# Patient Record
Sex: FEMALE | Race: WHITE
[De-identification: names, ages, dates, MRNs, and addresses within clinical notes are randomized per-mention and may not be internally consistent; named-entity substitution may affect disease eponyms.]

---

## 2019-07-16 ENCOUNTER — HOSPITAL ENCOUNTER (INPATIENT)
Dept: HOSPITAL 54 - ER | Age: 69
LOS: 5 days | Discharge: HOME HEALTH | DRG: 570 | End: 2019-07-21
Attending: INTERNAL MEDICINE | Admitting: INTERNAL MEDICINE
Payer: MEDICARE

## 2019-07-16 VITALS — WEIGHT: 175 LBS | BODY MASS INDEX: 29.88 KG/M2 | HEIGHT: 64 IN

## 2019-07-16 VITALS — DIASTOLIC BLOOD PRESSURE: 60 MMHG | SYSTOLIC BLOOD PRESSURE: 129 MMHG

## 2019-07-16 VITALS — DIASTOLIC BLOOD PRESSURE: 84 MMHG | SYSTOLIC BLOOD PRESSURE: 161 MMHG

## 2019-07-16 DIAGNOSIS — E78.5: ICD-10-CM

## 2019-07-16 DIAGNOSIS — N17.0: ICD-10-CM

## 2019-07-16 DIAGNOSIS — I12.9: ICD-10-CM

## 2019-07-16 DIAGNOSIS — E11.69: ICD-10-CM

## 2019-07-16 DIAGNOSIS — E87.1: ICD-10-CM

## 2019-07-16 DIAGNOSIS — E11.42: ICD-10-CM

## 2019-07-16 DIAGNOSIS — L03.116: Primary | ICD-10-CM

## 2019-07-16 DIAGNOSIS — E87.6: ICD-10-CM

## 2019-07-16 DIAGNOSIS — L60.3: ICD-10-CM

## 2019-07-16 DIAGNOSIS — E86.9: ICD-10-CM

## 2019-07-16 DIAGNOSIS — N18.9: ICD-10-CM

## 2019-07-16 DIAGNOSIS — E11.65: ICD-10-CM

## 2019-07-16 DIAGNOSIS — Z79.4: ICD-10-CM

## 2019-07-16 DIAGNOSIS — Z79.84: ICD-10-CM

## 2019-07-16 DIAGNOSIS — L97.528: ICD-10-CM

## 2019-07-16 DIAGNOSIS — E11.621: ICD-10-CM

## 2019-07-16 DIAGNOSIS — M86.8X7: ICD-10-CM

## 2019-07-16 DIAGNOSIS — E11.22: ICD-10-CM

## 2019-07-16 LAB
ALBUMIN SERPL BCP-MCNC: 3.1 G/DL (ref 3.4–5)
ALP SERPL-CCNC: 103 U/L (ref 46–116)
ALT SERPL W P-5'-P-CCNC: 17 U/L (ref 12–78)
APPEARANCE UR: CLEAR
AST SERPL W P-5'-P-CCNC: 11 U/L (ref 15–37)
BASOPHILS # BLD AUTO: 0.2 /CMM (ref 0–0.2)
BASOPHILS NFR BLD AUTO: 1.3 % (ref 0–2)
BILIRUB DIRECT SERPL-MCNC: 0.1 MG/DL (ref 0–0.2)
BILIRUB SERPL-MCNC: 0.5 MG/DL (ref 0.2–1)
BILIRUB UR QL STRIP: NEGATIVE
BUN SERPL-MCNC: 25 MG/DL (ref 7–18)
CALCIUM SERPL-MCNC: 9.4 MG/DL (ref 8.5–10.1)
CHLORIDE SERPL-SCNC: 96 MMOL/L (ref 98–107)
CO2 SERPL-SCNC: 26 MMOL/L (ref 21–32)
COLOR UR: YELLOW
CREAT SERPL-MCNC: 1.5 MG/DL (ref 0.6–1.3)
EOSINOPHIL NFR BLD AUTO: 0.6 % (ref 0–6)
GLUCOSE SERPL-MCNC: 527 MG/DL (ref 74–106)
GLUCOSE UR STRIP-MCNC: (no result) MG/DL
HCT VFR BLD AUTO: 40 % (ref 33–45)
HGB BLD-MCNC: 13.5 G/DL (ref 11.5–14.8)
HGB UR QL STRIP: (no result) ERY/UL
KETONES UR STRIP-MCNC: NEGATIVE MG/DL
LEUKOCYTE ESTERASE UR QL STRIP: NEGATIVE
LYMPHOCYTES NFR BLD AUTO: 1.8 /CMM (ref 0.8–4.8)
LYMPHOCYTES NFR BLD AUTO: 15.5 % (ref 20–44)
MCHC RBC AUTO-ENTMCNC: 34 G/DL (ref 31–36)
MCV RBC AUTO: 88 FL (ref 82–100)
MONOCYTES NFR BLD AUTO: 0.9 /CMM (ref 0.1–1.3)
MONOCYTES NFR BLD AUTO: 7.4 % (ref 2–12)
NEUTROPHILS # BLD AUTO: 8.8 /CMM (ref 1.8–8.9)
NEUTROPHILS NFR BLD AUTO: 75.2 % (ref 43–81)
NITRITE UR QL STRIP: NEGATIVE
PH UR STRIP: 6 [PH] (ref 5–8)
PLATELET # BLD AUTO: 316 /CMM (ref 150–450)
POTASSIUM SERPL-SCNC: 4.6 MMOL/L (ref 3.5–5.1)
PROT SERPL-MCNC: 8.2 G/DL (ref 6.4–8.2)
PROT UR QL STRIP: (no result) MG/DL
RBC # BLD AUTO: 4.52 MIL/UL (ref 4–5.2)
RBC #/AREA URNS HPF: (no result) /HPF (ref 0–2)
SODIUM SERPL-SCNC: 132 MMOL/L (ref 136–145)
UROBILINOGEN UR STRIP-MCNC: 0.2 EU/DL
WBC #/AREA URNS HPF: (no result) /HPF (ref 0–3)
WBC NRBC COR # BLD AUTO: 11.7 K/UL (ref 4.3–11)

## 2019-07-16 PROCEDURE — A6403 STERILE GAUZE>16 <= 48 SQ IN: HCPCS

## 2019-07-16 PROCEDURE — G0378 HOSPITAL OBSERVATION PER HR: HCPCS

## 2019-07-16 RX ADMIN — METFORMIN HYDROCHLORIDE SCH MG: 500 TABLET, FILM COATED ORAL at 17:17

## 2019-07-16 RX ADMIN — CARVEDILOL SCH MG: 12.5 TABLET, FILM COATED ORAL at 17:00

## 2019-07-16 RX ADMIN — INSULIN HUMAN PRN UNIT: 100 INJECTION, SOLUTION PARENTERAL at 18:10

## 2019-07-16 RX ADMIN — SODIUM CHLORIDE PRN MLS/HR: 9 INJECTION, SOLUTION INTRAVENOUS at 18:08

## 2019-07-16 RX ADMIN — CARVEDILOL SCH MG: 12.5 TABLET, FILM COATED ORAL at 17:18

## 2019-07-16 RX ADMIN — Medication SCH EACH: at 21:31

## 2019-07-16 RX ADMIN — ENOXAPARIN SODIUM SCH MG: 40 INJECTION SUBCUTANEOUS at 17:30

## 2019-07-16 RX ADMIN — HUMAN INSULIN PRN UNIT: 100 INJECTION, SOLUTION SUBCUTANEOUS at 21:32

## 2019-07-16 RX ADMIN — ATORVASTATIN CALCIUM SCH MG: 40 TABLET, FILM COATED ORAL at 21:31

## 2019-07-16 RX ADMIN — Medication SCH EACH: at 18:11

## 2019-07-16 RX ADMIN — PIPERACILLIN SODIUM AND TAZOBACTAM SODIUM SCH MLS/HR: .5; 4 INJECTION, POWDER, LYOPHILIZED, FOR SOLUTION INTRAVENOUS at 18:09

## 2019-07-16 RX ADMIN — PIPERACILLIN SODIUM AND TAZOBACTAM SODIUM SCH MLS/HR: .5; 4 INJECTION, POWDER, LYOPHILIZED, FOR SOLUTION INTRAVENOUS at 23:51

## 2019-07-16 NOTE — NUR
RN MS NOTES

RECEIVED PT FROM E.R. STAFF VIA [facility], PT IS ALERT AND ORIENTED, ASSISTED TO BED, MADE 
COMFORTABLE, ROOM SET UP ORIENTATION PROVIDED, VERBALIZED UNDERSTANDING, NO COMPLAINT OF 
PAIN, RESPIRATIONS NORMAL, LUNG SOUNDS CLEAR, ABLE TO AMBULATE WITH ASSISTANCE, NEEDS 
ATTENDED.

## 2019-07-16 NOTE — NUR
RN MS NOTES

PT IN BED, AWAKE, ALERT AND ORIENTED, WATCHING TV AND EATING DINNER, PM MEDS GIVEN AS 
ORDERED, PLAN OF CARE DISCUSSED WITH PT, VERBALIZED UNDERSTANDING, ALL NEEDS ATTENDED.

## 2019-07-16 NOTE — NUR
MS/RN NOTES



RECEIVED PT. LYING IN BED. PT. IS AWAKE, ALERT AND ORIENTED X4. BREATHING EVEN AND UNLABORED 
ON ROOM AIR. NO SOB, RESPIRATORY DISTRESS OR COMPLAINTS OF PAIN NOTED AT THIS TIME. PT. WITH 
LEFT EYE TOTAL BLINDNESS AND RIGHT EYE PARTIAL BLINDNESS. PT. WITH RIGHT AC 18 GAUGE 
PERIPHERAL IV PRESENT, PATENT AND INTACT ADMINISTERING TO PT. NS @ 75 ML/HR. EDUCATED PT. ON 
CALLING FOR ASSISTANCE BEFORE AMBULATING, PT. VERBALIZED UNDERSTANDING. BED LOCKED AND IN 
LOWEST POSITION, SIDE RAILS UP X3, BED ALARM ON, CALL LIGHT WITHIN REACH, WILL CONTINUE TO 
MONITOR.

## 2019-07-16 NOTE — NUR
PT BIB SELF C/O SEND FROM PMD OFFICE FOR INFECTED R GREATER TOE, PT IS AAOX3, 
NOT IN RESPIRATORY DISTRESS, HOOKED TO MONITOR, KEPT RESTED AND COMFORTABLE, 
WILL CONTINUE TO MONITOR.

## 2019-07-17 VITALS — SYSTOLIC BLOOD PRESSURE: 138 MMHG | DIASTOLIC BLOOD PRESSURE: 72 MMHG

## 2019-07-17 VITALS — DIASTOLIC BLOOD PRESSURE: 68 MMHG | SYSTOLIC BLOOD PRESSURE: 128 MMHG

## 2019-07-17 VITALS — SYSTOLIC BLOOD PRESSURE: 135 MMHG | DIASTOLIC BLOOD PRESSURE: 75 MMHG

## 2019-07-17 LAB
BASOPHILS # BLD AUTO: 0.1 /CMM (ref 0–0.2)
BASOPHILS NFR BLD AUTO: 0.5 % (ref 0–2)
BUN SERPL-MCNC: 26 MG/DL (ref 7–18)
CALCIUM SERPL-MCNC: 8.9 MG/DL (ref 8.5–10.1)
CHLORIDE SERPL-SCNC: 103 MMOL/L (ref 98–107)
CHOLEST SERPL-MCNC: 137 MG/DL (ref ?–200)
CO2 SERPL-SCNC: 26 MMOL/L (ref 21–32)
CREAT SERPL-MCNC: 1.5 MG/DL (ref 0.6–1.3)
EOSINOPHIL NFR BLD AUTO: 1.9 % (ref 0–6)
GLUCOSE SERPL-MCNC: 202 MG/DL (ref 74–106)
HCT VFR BLD AUTO: 33 % (ref 33–45)
HDLC SERPL-MCNC: 35 MG/DL (ref 40–60)
HGB BLD-MCNC: 11.5 G/DL (ref 11.5–14.8)
LDLC SERPL DIRECT ASSAY-MCNC: 82 MG/DL (ref 0–99)
LYMPHOCYTES NFR BLD AUTO: 2.4 /CMM (ref 0.8–4.8)
LYMPHOCYTES NFR BLD AUTO: 22.5 % (ref 20–44)
MAGNESIUM SERPL-MCNC: 1.9 MG/DL (ref 1.8–2.4)
MCHC RBC AUTO-ENTMCNC: 35 G/DL (ref 31–36)
MCV RBC AUTO: 86 FL (ref 82–100)
MONOCYTES NFR BLD AUTO: 1 /CMM (ref 0.1–1.3)
MONOCYTES NFR BLD AUTO: 9.5 % (ref 2–12)
NEUTROPHILS # BLD AUTO: 7.1 /CMM (ref 1.8–8.9)
NEUTROPHILS NFR BLD AUTO: 65.6 % (ref 43–81)
PHOSPHATE SERPL-MCNC: 3.8 MG/DL (ref 2.5–4.9)
PLATELET # BLD AUTO: 285 /CMM (ref 150–450)
POTASSIUM SERPL-SCNC: 4.4 MMOL/L (ref 3.5–5.1)
RBC # BLD AUTO: 3.86 MIL/UL (ref 4–5.2)
SODIUM SERPL-SCNC: 137 MMOL/L (ref 136–145)
TRIGL SERPL-MCNC: 158 MG/DL (ref 30–150)
TSH SERPL DL<=0.005 MIU/L-ACNC: 0.99 UIU/ML (ref 0.36–3.74)
WBC NRBC COR # BLD AUTO: 10.8 K/UL (ref 4.3–11)

## 2019-07-17 PROCEDURE — 0HTRXZZ RESECTION OF TOE NAIL, EXTERNAL APPROACH: ICD-10-PCS | Performed by: PODIATRIST

## 2019-07-17 PROCEDURE — 0JBR0ZZ EXCISION OF LEFT FOOT SUBCUTANEOUS TISSUE AND FASCIA, OPEN APPROACH: ICD-10-PCS | Performed by: PODIATRIST

## 2019-07-17 RX ADMIN — CARVEDILOL SCH MG: 12.5 TABLET, FILM COATED ORAL at 16:35

## 2019-07-17 RX ADMIN — ENOXAPARIN SODIUM SCH MG: 40 INJECTION SUBCUTANEOUS at 14:57

## 2019-07-17 RX ADMIN — METFORMIN HYDROCHLORIDE SCH MG: 500 TABLET, FILM COATED ORAL at 16:36

## 2019-07-17 RX ADMIN — ATORVASTATIN CALCIUM SCH MG: 40 TABLET, FILM COATED ORAL at 22:11

## 2019-07-17 RX ADMIN — SODIUM CHLORIDE PRN MLS/HR: 9 INJECTION, SOLUTION INTRAVENOUS at 14:56

## 2019-07-17 RX ADMIN — PIPERACILLIN SODIUM AND TAZOBACTAM SODIUM SCH MLS/HR: .5; 4 INJECTION, POWDER, LYOPHILIZED, FOR SOLUTION INTRAVENOUS at 06:17

## 2019-07-17 RX ADMIN — INSULIN HUMAN PRN UNIT: 100 INJECTION, SOLUTION PARENTERAL at 17:01

## 2019-07-17 RX ADMIN — INSULIN HUMAN PRN UNIT: 100 INJECTION, SOLUTION PARENTERAL at 11:50

## 2019-07-17 RX ADMIN — PIPERACILLIN SODIUM AND TAZOBACTAM SODIUM SCH MLS/HR: .5; 4 INJECTION, POWDER, LYOPHILIZED, FOR SOLUTION INTRAVENOUS at 12:07

## 2019-07-17 RX ADMIN — METFORMIN HYDROCHLORIDE SCH MG: 500 TABLET, FILM COATED ORAL at 08:05

## 2019-07-17 RX ADMIN — Medication SCH EACH: at 11:52

## 2019-07-17 RX ADMIN — Medication SCH EACH: at 16:35

## 2019-07-17 RX ADMIN — PIPERACILLIN SODIUM AND TAZOBACTAM SODIUM SCH MLS/HR: .5; 4 INJECTION, POWDER, LYOPHILIZED, FOR SOLUTION INTRAVENOUS at 17:14

## 2019-07-17 RX ADMIN — Medication SCH EACH: at 22:18

## 2019-07-17 RX ADMIN — INSULIN GLARGINE SCH UNIT: 100 INJECTION, SOLUTION SUBCUTANEOUS at 22:00

## 2019-07-17 RX ADMIN — Medication SCH EACH: at 17:01

## 2019-07-17 RX ADMIN — Medication SCH EACH: at 06:50

## 2019-07-17 RX ADMIN — CARVEDILOL SCH MG: 12.5 TABLET, FILM COATED ORAL at 08:05

## 2019-07-17 RX ADMIN — INSULIN HUMAN PRN UNIT: 100 INJECTION, SOLUTION PARENTERAL at 06:53

## 2019-07-17 NOTE — NUR
RN NOTES



PATIENT SEEN AND EVALUATED BY DR GLORIA WITH ORDER TO OBTAINED CONSENT FOR LEFT FOOT 
WOUND DEBRIDEMENT. PROCEDURE EXPLAINED BY DR GLORIA AND PT VERBALIZED UNDERSTANDING. 
CONSENT SIGNED.

## 2019-07-17 NOTE — NUR
RN NOTES



MRI TRANSPORT STAFF CAME TO PICK-UP PT FOR MRI BUT PT REFUSED TO HAVE IT DONE TODAY. MRI 
TECH BURKE MADE AWARE AND WILL DO MRI OF LEFT FOOT TOMORROW. WILL ENDORSE.

## 2019-07-17 NOTE — NUR
RN NOTES



CALLED MRI TECH IF HE WILL DO MRI OF LEFT FOOT TODAY AND SAID "YES" AND WILL DO IT LATER 
TODAY.

## 2019-07-17 NOTE — NUR
WOUND CARE CONSULT: PT PRESENTS WITH LEFT GREAT TOE CRUSTED WOUND WITH ODOR,  SWELLING AND 
REDNESS TO TOE, PRESENT ON ADMISSION. NO DRAINAGE NOTED. PT IS AMBULATORY AND CONTINENT. DR GLORIA AWARE OF DPM CONSULT REQUEST. WILL SEE PEDRO LOPEZ IN AGREEMENT WITH PLAN OF CARE.

## 2019-07-17 NOTE — NUR
MS RN OPENING NOTES



RECEIVED PT AWAKE IN BED IN NO ACUTE SIGNS OF DISTRESS. A/O X4. ABLE TO MAKE NEEDS KNOWN, 
DENIES PAIN OR ANY DISCOMFORTS AT THIS TIME. ON ROOM AIR, BREATHING EVEN AND UNLABORED PT. 
WITH LEFT EYE TOTAL BLINDNESS AND RIGHT EYE PARTIAL BLINDNESS. IV ACCESS ON LAC G #20 PATENT 
AND INTACT, IVF OF NS @ 75 ML/HR INFUSING, NO S/S OF INFILTRATIONS NOTED. ADVISED PT. TO 
CALL FOR ASSISTANCE BEFORE AMBULATING, PT. VERBALIZED UNDERSTANDING. BED LOCKED AND IN 
LOWEST POSITION, SIDE RAILS UP X3, BED ALARM ON, CALL LIGHT WITHIN REACH. WILL CONTINUE TO 
MONITOR.

## 2019-07-17 NOTE — NUR
MS RN CLOSING NOTES



PT AWAKE AND RESTING IN BED AT MODERATE HIGH BACKREST POSITION. A/O X4. ABLE TO MAKE NEEDS 
KNOWN. ON ROOM AIR, TOLERATING WELL WITH NO SOB NOTED ALL THROUGHOUT THE DAY. PATIENT WITH 
LEFT EYE TOTAL BLINDNESS AND RIGHT EYE PARTIAL BLINDNESS. IV ACCESS ON LAC G #20 PATENT AND 
INTACT, IVF OF NS @ 75 ML/HR INFUSING WELL, NO S/S OF INFILTRATIONS NOTED. ADVISED PT. TO 
CALL FOR ASSISTANCE WHEN GETTING OUT OF BED, PT VERBALIZED UNDERSTANDING. ALL NEEDS AND CARE 
ATTENDED WELL. SAFETY MEASURES KEPT IN PLACE. BED LOCKED AND IN LOWEST POSITION WITH SIDE 
RAILS UP X3. BED ALARM ON AND CALL LIGHT WITHIN REACH. WILL ENDORSE TO NIGHT SHIFT NURSE FOR 
VANDANA.

## 2019-07-17 NOTE — NUR
MS/RN NOTES



RECEIVED PT. SITTING UP IN BED. PT. IS AWAKE, ALERT AND ORIENTED X4. BREATHING EVEN AND 
UNLABORED ON ROOM AIR. NO SOB, RESPIRATORY DISTRESS OR COMPLAINTS OF PAIN NOTED AT THIS 
TIME. PT. WITH LEFT EYE TOTAL BLINDNESS AND RIGHT EYE PARTIAL BLINDNESS. PT. WITH LEFT AC 20 
GAUGE PERIPHERAL IV PRESENT, PATENT AND INTACT ADMINISTERING TO PT. NS @ 75 ML/HR. PT. IS 
STATUS POST LEFT GREAT TOE DEBRIDEMENT TODAY WITH DR. CHENG. POST OP DRESSING PRESENT, 
CLEAN, DRY AND INTACT. EDUCATED PT. ON CALLING FOR ASSISTANCE BEFORE AMBULATING, PT. 
VERBALIZED UNDERSTANDING. BED LOCKED AND IN LOWEST POSITION, SIDE RAILS UP X3, BED ALARM ON, 
CALL LIGHT WITHIN REACH, WILL CONTINUE TO MONITOR.

## 2019-07-17 NOTE — NUR
RN NOTES



LEFT BIG TOE WOUND DEBRIDEMENT DONE BY DR GLORIA. WOUND SPECIMEN COLLECTED FOR WOUND 
CULTURE.  PHOTOS TAKEN AFTER PROCEDURE AND FILED ON CHART. LEFT FOOT COVERED WITH DRY 
DRESSING  AND WRAPPED WITH ACE BANDAGE. NO BLEEDING NOTED. MRI OF LEFT FOOT AND WEIGHT 
BEARING OF LEFT HEEL WBAT ONLY FOR TRANSFER ORDERED BY DR GLORIA. WILL CONTINUE TO 
MONITOR.

## 2019-07-17 NOTE — NUR
MS/RN NOTES



PT. IS LYING IN BED RESTING. BREATHING EVEN AND UNLABORED ON ROOM AIR. NO SOB, RESPIRATORY 
DISTRESS OR COMPLAINTS OF PAIN NOTED AT THIS TIME. PT. WITH LEFT AC 20 GAUGE PERIPHERAL IV 
PRESENT, PATENT AND INTACT ADMINISTERING TO PT. NS @ 75 ML/HR. SAFETY PRECAUTIONS 
IMPLEMENTED AND IN PLACE. ALL PT. NEEDS MET. BED LOCKED AND IN LOWEST POSITION, SIDE RAILS 
UP X3, BED ALARM ON, CALL LIGHT WITHIN REACH, WILL ENDORSE TO DAYSHIFT NURSE FOR CONTINUITY 
OF CARE.

## 2019-07-18 VITALS — SYSTOLIC BLOOD PRESSURE: 138 MMHG | DIASTOLIC BLOOD PRESSURE: 72 MMHG

## 2019-07-18 VITALS — DIASTOLIC BLOOD PRESSURE: 74 MMHG | SYSTOLIC BLOOD PRESSURE: 145 MMHG

## 2019-07-18 VITALS — DIASTOLIC BLOOD PRESSURE: 75 MMHG | SYSTOLIC BLOOD PRESSURE: 151 MMHG

## 2019-07-18 LAB
BUN SERPL-MCNC: 25 MG/DL (ref 7–18)
CALCIUM SERPL-MCNC: 9.2 MG/DL (ref 8.5–10.1)
CHLORIDE SERPL-SCNC: 103 MMOL/L (ref 98–107)
CO2 SERPL-SCNC: 23 MMOL/L (ref 21–32)
CREAT SERPL-MCNC: 1.7 MG/DL (ref 0.6–1.3)
GLUCOSE SERPL-MCNC: 207 MG/DL (ref 74–106)
POTASSIUM SERPL-SCNC: 4.4 MMOL/L (ref 3.5–5.1)
SODIUM SERPL-SCNC: 136 MMOL/L (ref 136–145)

## 2019-07-18 RX ADMIN — INSULIN GLARGINE SCH UNIT: 100 INJECTION, SOLUTION SUBCUTANEOUS at 21:53

## 2019-07-18 RX ADMIN — INSULIN HUMAN PRN UNIT: 100 INJECTION, SOLUTION PARENTERAL at 17:36

## 2019-07-18 RX ADMIN — Medication SCH EACH: at 16:23

## 2019-07-18 RX ADMIN — ATORVASTATIN CALCIUM SCH MG: 40 TABLET, FILM COATED ORAL at 21:43

## 2019-07-18 RX ADMIN — CARVEDILOL SCH MG: 12.5 TABLET, FILM COATED ORAL at 08:17

## 2019-07-18 RX ADMIN — Medication SCH EACH: at 17:36

## 2019-07-18 RX ADMIN — Medication SCH EACH: at 12:26

## 2019-07-18 RX ADMIN — CARVEDILOL SCH MG: 12.5 TABLET, FILM COATED ORAL at 16:20

## 2019-07-18 RX ADMIN — METFORMIN HYDROCHLORIDE SCH MG: 500 TABLET, FILM COATED ORAL at 17:36

## 2019-07-18 RX ADMIN — INSULIN HUMAN PRN UNIT: 100 INJECTION, SOLUTION PARENTERAL at 07:17

## 2019-07-18 RX ADMIN — METFORMIN HYDROCHLORIDE SCH MG: 500 TABLET, FILM COATED ORAL at 08:18

## 2019-07-18 RX ADMIN — INSULIN HUMAN PRN UNIT: 100 INJECTION, SOLUTION PARENTERAL at 12:26

## 2019-07-18 RX ADMIN — DEXTROSE MONOHYDRATE SCH MLS/HR: 50 INJECTION, SOLUTION INTRAVENOUS at 21:43

## 2019-07-18 RX ADMIN — NEOMYCIN AND POLYMYXIN B SULFATES AND BACITRACIN ZINC SCH GM: 400; 3.5; 5 OINTMENT TOPICAL at 08:27

## 2019-07-18 RX ADMIN — DEXTROSE MONOHYDRATE SCH MLS/HR: 50 INJECTION, SOLUTION INTRAVENOUS at 20:32

## 2019-07-18 RX ADMIN — Medication SCH EACH: at 08:26

## 2019-07-18 RX ADMIN — Medication SCH EACH: at 07:15

## 2019-07-18 RX ADMIN — PIPERACILLIN SODIUM AND TAZOBACTAM SODIUM SCH MLS/HR: .5; 4 INJECTION, POWDER, LYOPHILIZED, FOR SOLUTION INTRAVENOUS at 00:09

## 2019-07-18 RX ADMIN — SODIUM CHLORIDE PRN MLS/HR: 9 INJECTION, SOLUTION INTRAVENOUS at 20:32

## 2019-07-18 RX ADMIN — ENOXAPARIN SODIUM SCH MG: 40 INJECTION SUBCUTANEOUS at 16:01

## 2019-07-18 RX ADMIN — PIPERACILLIN SODIUM AND TAZOBACTAM SODIUM SCH MLS/HR: .5; 4 INJECTION, POWDER, LYOPHILIZED, FOR SOLUTION INTRAVENOUS at 11:49

## 2019-07-18 RX ADMIN — Medication SCH EACH: at 21:52

## 2019-07-18 RX ADMIN — PIPERACILLIN SODIUM AND TAZOBACTAM SODIUM SCH MLS/HR: .5; 4 INJECTION, POWDER, LYOPHILIZED, FOR SOLUTION INTRAVENOUS at 06:07

## 2019-07-18 NOTE — NUR
MS/RN NOTES



PT. IS LYING IN BED, AWAKE, ALERT AND ORIENTED X4. BREATHING EVEN AND UNLABORED ON ROOM AIR. 
NO SOB, RESPIRATORY DISTRESS OR COMPLAINTS OF PAIN NOTED AT THIS TIME. PT. WITH LEFT EYE 
TOTAL BLINDNESS AND RIGHT EYE PARTIAL BLINDNESS. PT. WITH LEFT AC 20 GAUGE PERIPHERAL IV 
PRESENT, PATENT AND INTACT ADMINISTERING TO PT. NS @ 75 ML/HR. PT. WITH LEFT GREAT TOE POST 
OP DRESSING PRESENT, CLEAN, DRY AND INTACT. ALL PT. NEEDS MET. BED LOCKED AND IN LOWEST 
POSITION, SIDE RAILS UP X3, BED ALARM ON, CALL LIGHT WITHIN REACH, WILL ENDORSE TO DAYSHIFT 
NURSE FOR CONTINUITY OF CARE.

## 2019-07-18 NOTE — NUR
MS/RN  NOTE



THE PATIENT ALERT AND ORIENTED X4. IN ROOM AIR AND SATURATION IS AT 97%. DENIES SOB. 
RESPIRATION REGULAR AND UNLABORED. DENIES PAIN. THE PATIENT IN NO APPARENT DISTRESS. PRIVATE 
CAREGIVER AT THE BEDSIDE. LAC G 20 PATENT AND NORMAL SALINE INFUSING AT 75ML/HR AND NO S/S 
INFILTRATION NOTED. BED LOW AND LOCKED. SIDE RAILS UP X3. CALL LIGHT WITHIN REACH. WILL 
ENDORSE TO NIGHT SHIFT.

## 2019-07-18 NOTE — NUR
MS/RN   NOTE



THE PATIENT IS RECEIVED IN BED. ALERT AND ORIENTED X4. IN ROOM AIR AND DENIES SOB. 
RESPIRATION REGULAR AND UNLABORED. DENIES PAIN. THE PATIENT IN NO APPARENT DISTRESS. LAC G 
20 PATENT AND NORMAL SALINE INFUSING AT 75ML/HR AND NO S/S INFILTRATION NOTED. BED LOW AND 
LOCKED. SIDE RAILS UP X3. CALL LIGHT WITHIN REACH. WILL CONTINUE TO MONITOR.

## 2019-07-18 NOTE — NUR
MS/RN NOTES



RECEIVED PT. LYING IN BED. PT. IS AWAKE, ALERT AND ORIENTED X4. BREATHING EVEN AND UNLABORED 
ON ROOM AIR. NO SOB, RESPIRATORY DISTRESS OR COMPLAINTS OF PAIN NOTED AT THIS TIME. PT. WITH 
LEFT EYE TOTAL BLINDNESS AND RIGHT EYE PARTIAL BLINDNESS. PT. WITH LEFT AC 20 GAUGE 
PERIPHERAL IV PRESENT, PATENT AND INTACT ADMINISTERING TO PT. NS @ 75 ML/HR. PT. WITH LEFT 
GREAT TOE DRESSING PRESENT, CLEAN, DRY AND INTACT. EDUCATED PT. ON CALLING FOR ASSISTANCE 
BEFORE AMBULATING, PT. VERBALIZED UNDERSTANDING. BED LOCKED AND IN LOWEST POSITION, SIDE 
RAILS UP X3, BED ALARM ON, CALL LIGHT WITHIN REACH, WILL CONTINUE TO MONITOR.

## 2019-07-19 VITALS — DIASTOLIC BLOOD PRESSURE: 76 MMHG | SYSTOLIC BLOOD PRESSURE: 153 MMHG

## 2019-07-19 VITALS — SYSTOLIC BLOOD PRESSURE: 168 MMHG | DIASTOLIC BLOOD PRESSURE: 100 MMHG

## 2019-07-19 VITALS — DIASTOLIC BLOOD PRESSURE: 88 MMHG | SYSTOLIC BLOOD PRESSURE: 172 MMHG

## 2019-07-19 LAB
BUN SERPL-MCNC: 23 MG/DL (ref 7–18)
CALCIUM SERPL-MCNC: 9.3 MG/DL (ref 8.5–10.1)
CHLORIDE SERPL-SCNC: 106 MMOL/L (ref 98–107)
CO2 SERPL-SCNC: 26 MMOL/L (ref 21–32)
CREAT SERPL-MCNC: 1.4 MG/DL (ref 0.6–1.3)
GLUCOSE SERPL-MCNC: 163 MG/DL (ref 74–106)
POTASSIUM SERPL-SCNC: 4.2 MMOL/L (ref 3.5–5.1)
SODIUM SERPL-SCNC: 141 MMOL/L (ref 136–145)

## 2019-07-19 PROCEDURE — 02HV33Z INSERTION OF INFUSION DEVICE INTO SUPERIOR VENA CAVA, PERCUTANEOUS APPROACH: ICD-10-PCS | Performed by: NURSE PRACTITIONER

## 2019-07-19 PROCEDURE — B548ZZA ULTRASONOGRAPHY OF SUPERIOR VENA CAVA, GUIDANCE: ICD-10-PCS | Performed by: NURSE PRACTITIONER

## 2019-07-19 RX ADMIN — CARVEDILOL SCH MG: 12.5 TABLET, FILM COATED ORAL at 16:34

## 2019-07-19 RX ADMIN — DEXTROSE MONOHYDRATE SCH MLS/HR: 50 INJECTION, SOLUTION INTRAVENOUS at 21:03

## 2019-07-19 RX ADMIN — HUMAN INSULIN PRN UNIT: 100 INJECTION, SOLUTION SUBCUTANEOUS at 21:20

## 2019-07-19 RX ADMIN — Medication SCH EACH: at 06:45

## 2019-07-19 RX ADMIN — ATORVASTATIN CALCIUM SCH MG: 40 TABLET, FILM COATED ORAL at 21:03

## 2019-07-19 RX ADMIN — DEXTROSE MONOHYDRATE SCH MLS/HR: 50 INJECTION, SOLUTION INTRAVENOUS at 14:29

## 2019-07-19 RX ADMIN — Medication SCH EACH: at 11:47

## 2019-07-19 RX ADMIN — NEOMYCIN AND POLYMYXIN B SULFATES AND BACITRACIN ZINC SCH GM: 400; 3.5; 5 OINTMENT TOPICAL at 08:31

## 2019-07-19 RX ADMIN — INSULIN HUMAN PRN UNIT: 100 INJECTION, SOLUTION PARENTERAL at 11:49

## 2019-07-19 RX ADMIN — Medication SCH EACH: at 16:33

## 2019-07-19 RX ADMIN — CARVEDILOL SCH MG: 12.5 TABLET, FILM COATED ORAL at 08:29

## 2019-07-19 RX ADMIN — INSULIN HUMAN PRN UNIT: 100 INJECTION, SOLUTION PARENTERAL at 16:54

## 2019-07-19 RX ADMIN — INSULIN GLARGINE SCH UNIT: 100 INJECTION, SOLUTION SUBCUTANEOUS at 21:13

## 2019-07-19 RX ADMIN — Medication SCH EACH: at 21:11

## 2019-07-19 RX ADMIN — ENOXAPARIN SODIUM SCH MG: 40 INJECTION SUBCUTANEOUS at 14:29

## 2019-07-19 RX ADMIN — METFORMIN HYDROCHLORIDE SCH MG: 500 TABLET, FILM COATED ORAL at 16:33

## 2019-07-19 RX ADMIN — METFORMIN HYDROCHLORIDE SCH MG: 500 TABLET, FILM COATED ORAL at 08:29

## 2019-07-19 RX ADMIN — INSULIN HUMAN PRN UNIT: 100 INJECTION, SOLUTION PARENTERAL at 06:46

## 2019-07-19 RX ADMIN — Medication SCH EACH: at 08:28

## 2019-07-19 RX ADMIN — Medication SCH EACH: at 16:41

## 2019-07-19 NOTE — NUR
RN OPENING NOTES

PT AWAKE AND RESTING IN BED. NO COMPLAINTS OF PAIN, SOB OR DISTRESS AT THIS TIME. PT HAS A 
LEFT AC #20 IV RUNNING NS @20 ML/HR. SAFETY PRECAUTIONS IN PLACE, BED IN LOWEST LOCKED 
POSITION, X2 SIDE RAILS UP AND CALL LIGHT WITHIN REACH. WILL CONTINUE TO MONITOR.

## 2019-07-19 NOTE — NUR
MS RN OPENING NOTES



Received patient in bed with daughter and caregiver at bedside. Breathing even and 
unlabored. Not in any distress, on room air. Patient has a CARLA PICC line with IVF running at 
75mL/hr.  No complaints at this time. Safety measures in place; call light within reach, bed 
in low, locked position. Will continue to monitor accordingly.

## 2019-07-19 NOTE — NUR
RN CLOSING NOTES

PT AWAKE AND RESTING IN BED. DAUGHTER AND CAREGIVER AT BEDSIDE. PT TRANSFERRED FROM ROOM 
207-2. ALL PT BELONGINGS BROUGHT WITH PATIENT DURING TRANSFER. NO COMPLAINTS OF PAIN, SOB OR 
DISTRESS DURING SHIFT. PT HAS A LEFT AC #20 IV RUNNING NS @75 ML/HR. WOUND CARE CARRIED OUT 
AS ORDERED. SAFETY PRECAUTIONS IN PLACE, BED IN LOWEST LOCKED POSITION, X2 SIDE RAILS UP AND 
CALL LIGHT WITHIN REACH. WILL ENDORSE TO NIGHT SHIFT NURSE FOR CONTINUITY OF CARE.

## 2019-07-19 NOTE — NUR
MS/RN NOTES



PT. IS LYING IN BED. PT. IS AWAKE, ALERT AND ORIENTED X4. BREATHING EVEN AND UNLABORED ON 
ROOM AIR. NO SOB, RESPIRATORY DISTRESS OR COMPLAINTS OF PAIN NOTED AT THIS TIME. PT. WITH 
LEFT AC 20 GAUGE PERIPHERAL IV PRESENT, PATENT AND INTACT ADMINISTERING TO PT. NS @ 75 
ML/HR. PT. WITH LEFT GREAT TOE DRESSING PRESENT, CLEAN, DRY AND INTACT. ALL PT. NEEDS MET. 
BED LOCKED AND IN LOWEST POSITION, SIDE RAILS UP X3, BED ALARM ON, CALL LIGHT WITHIN REACH, 
WILL ENDORSE TO DAYSHIFT NURSE FOR CONTINUITY OF CARE.

## 2019-07-20 VITALS — SYSTOLIC BLOOD PRESSURE: 157 MMHG | DIASTOLIC BLOOD PRESSURE: 80 MMHG

## 2019-07-20 VITALS — DIASTOLIC BLOOD PRESSURE: 80 MMHG | SYSTOLIC BLOOD PRESSURE: 157 MMHG

## 2019-07-20 VITALS — DIASTOLIC BLOOD PRESSURE: 79 MMHG | SYSTOLIC BLOOD PRESSURE: 169 MMHG

## 2019-07-20 VITALS — DIASTOLIC BLOOD PRESSURE: 96 MMHG | SYSTOLIC BLOOD PRESSURE: 195 MMHG

## 2019-07-20 VITALS — DIASTOLIC BLOOD PRESSURE: 72 MMHG | SYSTOLIC BLOOD PRESSURE: 166 MMHG

## 2019-07-20 VITALS — SYSTOLIC BLOOD PRESSURE: 171 MMHG | DIASTOLIC BLOOD PRESSURE: 96 MMHG

## 2019-07-20 LAB
BUN SERPL-MCNC: 19 MG/DL (ref 7–18)
CALCIUM SERPL-MCNC: 9 MG/DL (ref 8.5–10.1)
CHLORIDE SERPL-SCNC: 104 MMOL/L (ref 98–107)
CO2 SERPL-SCNC: 23 MMOL/L (ref 21–32)
CREAT SERPL-MCNC: 1.4 MG/DL (ref 0.6–1.3)
GLUCOSE SERPL-MCNC: 201 MG/DL (ref 74–106)
POTASSIUM SERPL-SCNC: 3.9 MMOL/L (ref 3.5–5.1)
SODIUM SERPL-SCNC: 138 MMOL/L (ref 136–145)

## 2019-07-20 RX ADMIN — Medication SCH EACH: at 16:45

## 2019-07-20 RX ADMIN — Medication SCH EACH: at 12:51

## 2019-07-20 RX ADMIN — Medication SCH EACH: at 20:23

## 2019-07-20 RX ADMIN — DEXTROSE MONOHYDRATE SCH MLS/HR: 50 INJECTION, SOLUTION INTRAVENOUS at 19:45

## 2019-07-20 RX ADMIN — DEXTROSE MONOHYDRATE SCH MLS/HR: 50 INJECTION, SOLUTION INTRAVENOUS at 07:51

## 2019-07-20 RX ADMIN — METFORMIN HYDROCHLORIDE SCH MG: 500 TABLET, FILM COATED ORAL at 17:30

## 2019-07-20 RX ADMIN — CARVEDILOL SCH MG: 12.5 TABLET, FILM COATED ORAL at 16:46

## 2019-07-20 RX ADMIN — CARVEDILOL SCH MG: 12.5 TABLET, FILM COATED ORAL at 08:25

## 2019-07-20 RX ADMIN — Medication SCH EACH: at 08:25

## 2019-07-20 RX ADMIN — METFORMIN HYDROCHLORIDE SCH MG: 500 TABLET, FILM COATED ORAL at 08:25

## 2019-07-20 RX ADMIN — ENOXAPARIN SODIUM SCH MG: 40 INJECTION SUBCUTANEOUS at 15:29

## 2019-07-20 RX ADMIN — INSULIN GLARGINE SCH UNIT: 100 INJECTION, SOLUTION SUBCUTANEOUS at 20:28

## 2019-07-20 RX ADMIN — ATORVASTATIN CALCIUM SCH MG: 40 TABLET, FILM COATED ORAL at 20:17

## 2019-07-20 RX ADMIN — INSULIN HUMAN PRN UNIT: 100 INJECTION, SOLUTION PARENTERAL at 12:54

## 2019-07-20 RX ADMIN — SODIUM CHLORIDE PRN MLS/HR: 9 INJECTION, SOLUTION INTRAVENOUS at 19:45

## 2019-07-20 RX ADMIN — INSULIN HUMAN PRN UNIT: 100 INJECTION, SOLUTION PARENTERAL at 06:32

## 2019-07-20 RX ADMIN — SODIUM CHLORIDE PRN MLS/HR: 9 INJECTION, SOLUTION INTRAVENOUS at 03:03

## 2019-07-20 RX ADMIN — Medication SCH EACH: at 06:37

## 2019-07-20 RX ADMIN — INSULIN HUMAN PRN UNIT: 100 INJECTION, SOLUTION PARENTERAL at 17:35

## 2019-07-20 RX ADMIN — Medication SCH EACH: at 17:36

## 2019-07-20 RX ADMIN — NEOMYCIN AND POLYMYXIN B SULFATES AND BACITRACIN ZINC SCH GM: 400; 3.5; 5 OINTMENT TOPICAL at 08:27

## 2019-07-20 NOTE — NUR
MS/RN OPENING NOTES

RECEIVED PATIENT IN BED, AWAKE, ALERT X3 ABLE TO VERBALIZE NEEDS,  MARIAN AT 
BEDSIDE DISCUSSING PLAN OF CARE AND DISCHARGE TRANSITIONAL CARE, REPORTED DISCHARGE TONIGHT 
AS THE THIRD DAY. PATIENT FAMILY INFORMED AND MADE AWARE THAT PATIENT HOME HEALTH WAS SET UP 
ALREADY WITH IV INFUSION, DISCHARGE PLANNING WAS REPORTED BUT FINALIZED ONLY TONIGHT. IV 
ANTIBIOTIC RUNNING,WIIL MONITOR. PATIENT REQUEST IF TONY AM IS POSSIBLE.WILL MONITOR.AND 
FOLLOW UP.

## 2019-07-20 NOTE — NUR
MS RN NOTES

PATIENT IN BED, ALERT ORIENTED X 4. NO ACUTE DISTRESS NOTED, BREATHING UNLABORED, NO SOB 
NOTED. IV ACCESS PATENT AND INTACT, NO REDNESS OR SWELLING NOTED.DUE MEDICATIONS GIVEN, NO 
ASE NOTED.NEEDS ATTENDED AND ANTICIPATED. KEPT CLEAN DRY AND COMFORTABLE. CALL LIGHT WITHIN 
REACH. SAFETY MEASURES IN PLACE. WILL ENDORSE TO NIGHT NURSE FOR CONTINUITY OF CARE.

## 2019-07-20 NOTE — NUR
MS RN NOTES

PATIENT IN BED, ALERT ORIENTED X 4. NO ACUTE DISTRESS NOTED, BREATHING UNLABORED, NO SOB 
NOTED. IV ACCESS PATENT AND INTACT, NO REDNESS OR SWELLING NOTED. CALL LIGHT WITHIN REACH. 
SAFETY MEASURES IN PLACE. WILL CONTINUE TO MONITOR ACCORDINGLY.

## 2019-07-20 NOTE — NUR
MS/RN NOTES

PATIENT BLOOD SUGAR , REFUSE TO HAVE SLIDING SCALE REPLACEMENT REGULAR INSULIN BUT 
ALLOWED FOR THE LONG ACTING, PROVIDED SNACKKS.

## 2019-07-20 NOTE — NUR
md johnson contacted and ordered for clonidine 0.2 mg po stat once and recheck blood 
pressure in an hour.

## 2019-07-20 NOTE — NUR
MS RN CLOSING NOTES



Patient sitting up in bed, alert, oriented x 4. Breathing even and unlabored. Not in any 
distress, on room air. IV fluids infusing at 75mL/hr. No complaints at this time. NBY509 
mg/dL. 3 units insulin given per sliding scale. No acute changes overnight. Safety measures 
in place; call light within reach, bed in low, locked position. Will endorse VANDANA to oncoming 
RN

## 2019-07-20 NOTE — NUR
MS/RN NOTES

PATIENT WITH ELEVATED BLOOD PRESSURE /96 RE CHECKED AFTER AN HOUR DUE TO SBP TAKEN 
EARLIER ALSO ELEVATED 171/96. PATIENT RECEIVED A DISCHARGE ORDER TONIGHT AND WILL BE SEND 
WITH A HOME HEALTH FOR IV ANTIBIOTIC THERAPY. PATIENT IS CONCERNED AND MD TO CONTACT, LEFT 
MESSAGE AWAITING FOR CALL BACK/ PATIENT IS ALSO CONCERN TO FOLLOW UP WITH DR QUARLES 
REGARDING WOUND F/U.

## 2019-07-20 NOTE — NUR
MS/RN NOTES

PER MD TO MONITOR B/P AND  WAS MADE AWARE FOR ELEVATED BLOOD PRESSURE, TO HOLD 
DISCHARGE TILL TOMORROW UNTIL PATIENT BLOOD PRESSURE IS IMPROVED.

## 2019-07-21 VITALS — SYSTOLIC BLOOD PRESSURE: 147 MMHG | DIASTOLIC BLOOD PRESSURE: 78 MMHG

## 2019-07-21 LAB
BUN SERPL-MCNC: 16 MG/DL (ref 7–18)
CALCIUM SERPL-MCNC: 8.7 MG/DL (ref 8.5–10.1)
CHLORIDE SERPL-SCNC: 108 MMOL/L (ref 98–107)
CO2 SERPL-SCNC: 24 MMOL/L (ref 21–32)
CREAT SERPL-MCNC: 1.2 MG/DL (ref 0.6–1.3)
GLUCOSE SERPL-MCNC: 154 MG/DL (ref 74–106)
POTASSIUM SERPL-SCNC: 3.7 MMOL/L (ref 3.5–5.1)
SODIUM SERPL-SCNC: 141 MMOL/L (ref 136–145)

## 2019-07-21 RX ADMIN — NEOMYCIN AND POLYMYXIN B SULFATES AND BACITRACIN ZINC SCH GM: 400; 3.5; 5 OINTMENT TOPICAL at 10:14

## 2019-07-21 RX ADMIN — CARVEDILOL SCH MG: 12.5 TABLET, FILM COATED ORAL at 10:14

## 2019-07-21 RX ADMIN — Medication SCH EACH: at 11:44

## 2019-07-21 RX ADMIN — Medication SCH EACH: at 10:14

## 2019-07-21 RX ADMIN — INSULIN HUMAN PRN UNIT: 100 INJECTION, SOLUTION PARENTERAL at 12:20

## 2019-07-21 RX ADMIN — METFORMIN HYDROCHLORIDE SCH MG: 500 TABLET, FILM COATED ORAL at 10:14

## 2019-07-21 RX ADMIN — DEXTROSE MONOHYDRATE SCH MLS/HR: 50 INJECTION, SOLUTION INTRAVENOUS at 01:11

## 2019-07-21 RX ADMIN — Medication SCH EACH: at 06:23

## 2019-07-21 NOTE — NUR
319-1

MS/RN NOTES

PATIENT ABLE TO SLEEP DURING THE NIGHT, MONITORED FOR ANY CHANGES, KEPT COMFORTABLE, IV 
FLUIDS RUNNING AT 5 ML/HT, ADMINISTERED IV ANTIBIOTIC THERAPY. BED LOCKED, CALL LIGHTS WITHN 
REACH. WILL ENDORSE TO AM RN FOR VANDANA.

## 2019-07-21 NOTE — NUR
MS RN OPENING NOTES

Received Patient awake and resting in bed. A/O x 4. VS stable with no acute distress. 
Breathing even and unlabored on room air with no respiratory distress. Denies pain. DOUBLE 
LUMEN PICC LINE on CARLA clean, dry, intact and flushing well. Dressings clean, dry, and 
intact. Safety precautions in place. Bed locked and set to lowest position with side rails x 
2 up. Call light within reach. All needs rendered at this time. Will continue to monitor.

## 2019-07-21 NOTE — NUR
MS RN DISCHARGE NOTES

Patient discharged for home at this time. Patient in stable condition. VS stable with no 
acute distress. Breathing even and unlabored on room air with no respiratory distress. 
Denies pain. Skin assessment done per protocol. Patient ambulatory with assist. Medication 
reconciliation and discharge orders reviewed and explained to Patient and Caregiver. Patient 
and Caregiver verbalized understanding. All belongings with Patient. Patient will follow up 
with Wound Specialist tomorrow. Escorted Patient via wheelchair to the [facility] for safety. 
Patient picked up by Nishi (Caregiver).

## 2019-07-22 ENCOUNTER — HOSPITAL ENCOUNTER (OUTPATIENT)
Dept: HOSPITAL 54 - WOU | Age: 69
Discharge: HOME | End: 2019-07-22
Attending: PODIATRIST
Payer: MEDICARE

## 2019-07-22 DIAGNOSIS — E11.621: Primary | ICD-10-CM

## 2019-07-22 DIAGNOSIS — I10: ICD-10-CM

## 2019-07-22 DIAGNOSIS — E11.42: ICD-10-CM

## 2019-07-22 DIAGNOSIS — H54.8: ICD-10-CM

## 2019-07-22 DIAGNOSIS — L97.523: ICD-10-CM

## 2019-07-29 ENCOUNTER — HOSPITAL ENCOUNTER (OUTPATIENT)
Dept: HOSPITAL 54 - WOU | Age: 69
Discharge: HOME HEALTH | End: 2019-07-29
Attending: PODIATRIST
Payer: MEDICARE

## 2019-07-29 DIAGNOSIS — H54.8: ICD-10-CM

## 2019-07-29 DIAGNOSIS — I10: ICD-10-CM

## 2019-07-29 DIAGNOSIS — E11.621: Primary | ICD-10-CM

## 2019-07-29 DIAGNOSIS — L97.522: ICD-10-CM

## 2019-07-29 DIAGNOSIS — E11.42: ICD-10-CM

## 2019-08-05 ENCOUNTER — HOSPITAL ENCOUNTER (OUTPATIENT)
Dept: HOSPITAL 54 - WOU | Age: 69
Discharge: HOME | End: 2019-08-05
Attending: PODIATRIST
Payer: MEDICARE

## 2019-08-05 DIAGNOSIS — H54.8: ICD-10-CM

## 2019-08-05 DIAGNOSIS — E11.42: ICD-10-CM

## 2019-08-05 DIAGNOSIS — L97.522: ICD-10-CM

## 2019-08-05 DIAGNOSIS — E11.621: Primary | ICD-10-CM

## 2019-08-05 DIAGNOSIS — I10: ICD-10-CM

## 2019-08-12 ENCOUNTER — HOSPITAL ENCOUNTER (OUTPATIENT)
Dept: HOSPITAL 54 - WOU | Age: 69
Discharge: HOME | End: 2019-08-12
Attending: PODIATRIST
Payer: MEDICARE

## 2019-08-12 DIAGNOSIS — E11.42: ICD-10-CM

## 2019-08-12 DIAGNOSIS — E11.621: Primary | ICD-10-CM

## 2019-08-12 DIAGNOSIS — H54.8: ICD-10-CM

## 2019-08-12 DIAGNOSIS — L97.522: ICD-10-CM

## 2019-08-19 ENCOUNTER — HOSPITAL ENCOUNTER (OUTPATIENT)
Dept: HOSPITAL 54 - WOU | Age: 69
Discharge: HOME HEALTH | End: 2019-08-19
Attending: PODIATRIST
Payer: MEDICARE

## 2019-08-19 DIAGNOSIS — E11.42: ICD-10-CM

## 2019-08-19 DIAGNOSIS — E11.69: ICD-10-CM

## 2019-08-19 DIAGNOSIS — E11.621: Primary | ICD-10-CM

## 2019-08-19 DIAGNOSIS — H54.8: ICD-10-CM

## 2019-08-19 DIAGNOSIS — L97.522: ICD-10-CM

## 2019-08-19 DIAGNOSIS — M86.9: ICD-10-CM

## 2019-08-26 ENCOUNTER — HOSPITAL ENCOUNTER (OUTPATIENT)
Dept: HOSPITAL 54 - WOU | Age: 69
Discharge: HOME | End: 2019-08-26
Attending: PODIATRIST
Payer: MEDICARE

## 2019-08-26 DIAGNOSIS — H54.8: ICD-10-CM

## 2019-08-26 DIAGNOSIS — E11.621: Primary | ICD-10-CM

## 2019-08-26 DIAGNOSIS — E11.42: ICD-10-CM

## 2019-08-26 DIAGNOSIS — L97.522: ICD-10-CM

## 2019-09-05 ENCOUNTER — HOSPITAL ENCOUNTER (OUTPATIENT)
Dept: HOSPITAL 54 - WOU | Age: 69
Discharge: HOME HEALTH | End: 2019-09-05
Attending: PODIATRIST
Payer: MEDICARE

## 2019-09-05 DIAGNOSIS — I10: ICD-10-CM

## 2019-09-05 DIAGNOSIS — E11.42: ICD-10-CM

## 2019-09-05 DIAGNOSIS — E11.621: Primary | ICD-10-CM

## 2019-09-05 DIAGNOSIS — H54.8: ICD-10-CM

## 2019-09-05 DIAGNOSIS — L97.522: ICD-10-CM

## 2019-09-09 ENCOUNTER — HOSPITAL ENCOUNTER (OUTPATIENT)
Dept: HOSPITAL 54 - WOU | Age: 69
Discharge: HOME HEALTH | End: 2019-09-09
Attending: PODIATRIST
Payer: MEDICARE

## 2019-09-09 DIAGNOSIS — E11.69: ICD-10-CM

## 2019-09-09 DIAGNOSIS — E11.42: ICD-10-CM

## 2019-09-09 DIAGNOSIS — M86.8X7: ICD-10-CM

## 2019-09-09 DIAGNOSIS — H54.8: ICD-10-CM

## 2019-09-09 DIAGNOSIS — L97.522: ICD-10-CM

## 2019-09-09 DIAGNOSIS — E11.621: Primary | ICD-10-CM

## 2019-09-12 ENCOUNTER — HOSPITAL ENCOUNTER (OUTPATIENT)
Dept: HOSPITAL 54 - MRI | Age: 69
Discharge: HOME | End: 2019-09-12
Attending: PODIATRIST
Payer: MEDICARE

## 2019-09-12 DIAGNOSIS — E11.9: ICD-10-CM

## 2019-09-12 DIAGNOSIS — M19.072: ICD-10-CM

## 2019-09-12 DIAGNOSIS — M86.8X7: Primary | ICD-10-CM

## 2019-09-12 DIAGNOSIS — I10: ICD-10-CM

## 2019-09-16 ENCOUNTER — HOSPITAL ENCOUNTER (OUTPATIENT)
Dept: HOSPITAL 54 - WOU | Age: 69
Discharge: HOME | End: 2019-09-16
Attending: PODIATRIST
Payer: MEDICARE

## 2019-09-16 DIAGNOSIS — E11.69: ICD-10-CM

## 2019-09-16 DIAGNOSIS — E11.621: Primary | ICD-10-CM

## 2019-09-16 DIAGNOSIS — Z79.4: ICD-10-CM

## 2019-09-16 DIAGNOSIS — M86.672: ICD-10-CM

## 2019-09-16 DIAGNOSIS — H54.8: ICD-10-CM

## 2019-09-16 DIAGNOSIS — E11.42: ICD-10-CM

## 2019-09-16 DIAGNOSIS — L97.522: ICD-10-CM

## 2019-09-23 ENCOUNTER — HOSPITAL ENCOUNTER (OUTPATIENT)
Dept: HOSPITAL 54 - WOU | Age: 69
Discharge: HOME HEALTH | End: 2019-09-23
Attending: PODIATRIST
Payer: MEDICARE

## 2019-09-23 DIAGNOSIS — L84: ICD-10-CM

## 2019-09-23 DIAGNOSIS — Z79.4: ICD-10-CM

## 2019-09-23 DIAGNOSIS — H54.8: ICD-10-CM

## 2019-09-23 DIAGNOSIS — E11.42: Primary | ICD-10-CM

## 2019-09-23 PROCEDURE — G0463 HOSPITAL OUTPT CLINIC VISIT: HCPCS

## 2019-09-30 ENCOUNTER — HOSPITAL ENCOUNTER (OUTPATIENT)
Dept: HOSPITAL 54 - WOU | Age: 69
Discharge: HOME | End: 2019-09-30
Attending: PODIATRIST
Payer: MEDICARE

## 2019-09-30 DIAGNOSIS — I10: ICD-10-CM

## 2019-09-30 DIAGNOSIS — H54.8: ICD-10-CM

## 2019-09-30 DIAGNOSIS — E11.69: ICD-10-CM

## 2019-09-30 DIAGNOSIS — M86.672: ICD-10-CM

## 2019-09-30 DIAGNOSIS — E11.42: Primary | ICD-10-CM

## 2019-09-30 DIAGNOSIS — L84: ICD-10-CM

## 2019-09-30 DIAGNOSIS — Z79.4: ICD-10-CM

## 2019-09-30 PROCEDURE — G0463 HOSPITAL OUTPT CLINIC VISIT: HCPCS

## 2019-11-25 ENCOUNTER — HOSPITAL ENCOUNTER (OUTPATIENT)
Dept: HOSPITAL 54 - WOU | Age: 69
Discharge: HOME | End: 2019-11-25
Attending: STUDENT IN AN ORGANIZED HEALTH CARE EDUCATION/TRAINING PROGRAM
Payer: MEDICARE

## 2019-11-25 DIAGNOSIS — L84: ICD-10-CM

## 2019-11-25 DIAGNOSIS — Z79.4: ICD-10-CM

## 2019-11-25 DIAGNOSIS — M79.671: ICD-10-CM

## 2019-11-25 DIAGNOSIS — M79.672: ICD-10-CM

## 2019-11-25 DIAGNOSIS — I10: ICD-10-CM

## 2019-11-25 DIAGNOSIS — B35.1: Primary | ICD-10-CM

## 2019-11-25 DIAGNOSIS — E11.9: ICD-10-CM

## 2019-11-25 PROCEDURE — G0463 HOSPITAL OUTPT CLINIC VISIT: HCPCS

## 2024-01-22 ENCOUNTER — HOSPITAL ENCOUNTER (EMERGENCY)
Dept: HOSPITAL 12 - ER | Age: 74
Discharge: HOME | End: 2024-01-22
Payer: MEDICARE

## 2024-01-22 VITALS — BODY MASS INDEX: 27.48 KG/M2 | WEIGHT: 140 LBS | HEIGHT: 60 IN

## 2024-01-22 VITALS — OXYGEN SATURATION: 98 % | SYSTOLIC BLOOD PRESSURE: 148 MMHG | TEMPERATURE: 98.3 F | DIASTOLIC BLOOD PRESSURE: 75 MMHG

## 2024-01-22 DIAGNOSIS — I10: Primary | ICD-10-CM

## 2024-01-22 LAB
ALBUMIN SERPL BCG-MCNC: 3.6 G/DL (ref 3.4–5)
ALP SERPL-CCNC: 92 U/L (ref 50–136)
ALT SERPL W/O P-5'-P-CCNC: 14 U/L (ref 14–59)
AST SERPL-CCNC: 5 U/L (ref 15–37)
BASOPHILS # BLD AUTO: 0.1 K/UL (ref 0–0.2)
BASOPHILS NFR BLD AUTO: 0.6 % (ref 0–2)
BILIRUB DIRECT SERPL-MCNC: 0.1 MG/DL (ref 0–0.2)
BILIRUB SERPL-MCNC: 0.4 MG/DL (ref 0.2–1)
BUN SERPL-MCNC: 57 MG/DL (ref 7–18)
CALCIUM SERPL-MCNC: 9.8 MG/DL (ref 8.5–10.1)
CHLORIDE SERPL-SCNC: 101 MMOL/L (ref 98–107)
CO2 SERPL-SCNC: 25 MMOL/L (ref 21–32)
CREAT SERPL-MCNC: 4.4 MG/DL (ref 0.6–1.3)
EOSINOPHIL # BLD AUTO: 0.2 K/UL (ref 0–0.7)
EOSINOPHIL NFR BLD AUTO: 2 % (ref 0–7)
ERYTHROCYTE [DISTWIDTH] IN BLOOD BY AUTOMATED COUNT: 15 % (ref 12.3–17.7)
GLUCOSE SERPL-MCNC: 231 MG/DL (ref 74–106)
HCT VFR BLD AUTO: 35.9 % (ref 31.2–41.9)
HGB BLD-MCNC: 12.1 G/DL (ref 10.9–14.3)
LIPASE SERPL-CCNC: 52 U/L (ref 16–77)
LYMPHOCYTES # BLD AUTO: 1.7 K/UL (ref 0.8–4.8)
LYMPHOCYTES NFR BLD AUTO: 18.1 % (ref 20.5–51.5)
MANUAL DIF COMMENT BLD-IMP: 1
MCH RBC QN AUTO: 32 UUG (ref 24.7–32.8)
MCHC RBC AUTO-ENTMCNC: 34 G/DL (ref 32.3–35.6)
MCV RBC AUTO: 94.6 FL (ref 75.5–95.3)
MONOCYTES # BLD AUTO: 0.7 K/UL (ref 0.1–1.3)
MONOCYTES NFR BLD AUTO: 7.4 % (ref 0–11)
NEUTROPHILS # BLD AUTO: 6.7 K/UL (ref 1.8–8.9)
NEUTROPHILS NFR BLD AUTO: 71.9 % (ref 38.5–71.5)
PLATELET # BLD AUTO: 329 K/UL (ref 179–408)
POTASSIUM SERPL-SCNC: 4.7 MMOL/L (ref 3.5–5.1)
RBC # BLD AUTO: 3.79 MIL/UL (ref 3.63–4.92)
SODIUM SERPL-SCNC: 135 MMOL/L (ref 136–145)
WBC # BLD AUTO: 9.3 K/UL (ref 3.8–11.8)
WS STN SPEC: 8.3 G/DL (ref 6.4–8.2)

## 2024-01-22 PROCEDURE — A4663 DIALYSIS BLOOD PRESSURE CUFF: HCPCS

## 2024-01-22 PROCEDURE — A4606 OXYGEN PROBE USED W OXIMETER: HCPCS

## 2024-01-22 RX ADMIN — CLONIDINE HYDROCHLORIDE ONE MG: 0.1 TABLET ORAL at 13:32

## 2024-01-22 RX ADMIN — CLONIDINE HYDROCHLORIDE ONE MG: 0.1 TABLET ORAL at 12:37
